# Patient Record
Sex: MALE | Race: WHITE
[De-identification: names, ages, dates, MRNs, and addresses within clinical notes are randomized per-mention and may not be internally consistent; named-entity substitution may affect disease eponyms.]

---

## 2018-07-07 ENCOUNTER — HOSPITAL ENCOUNTER (EMERGENCY)
Dept: HOSPITAL 25 - UCCORT | Age: 63
Discharge: HOME | End: 2018-07-07
Payer: COMMERCIAL

## 2018-07-07 VITALS — DIASTOLIC BLOOD PRESSURE: 59 MMHG | SYSTOLIC BLOOD PRESSURE: 117 MMHG

## 2018-07-07 DIAGNOSIS — J32.9: Primary | ICD-10-CM

## 2018-07-07 DIAGNOSIS — Z87.891: ICD-10-CM

## 2018-07-07 PROCEDURE — 99212 OFFICE O/P EST SF 10 MIN: CPT

## 2018-07-07 PROCEDURE — G0463 HOSPITAL OUTPT CLINIC VISIT: HCPCS

## 2018-07-07 NOTE — UC
Throat Pain/Nasal [place] HPI





- HPI Summary


HPI Summary: 





scratchy throat that has progressed to sinus pain and pressure, fullness in the 

ears and productive cough, slightly elevated TEmp this am, did take tylenol





- History of Current Complaint


Stated Complaint: COUGH,TRINO,RN,PEEWEE


Time Seen by Provider: 07/07/18 12:34


Hx Obtained From: Patient


Onset/Duration: Sudden Onset, Lasting Days


Severity: Moderate


Cough: Productive


Associated Signs & Symptoms: Positive: Dysphagia, Wheezing, Sinus Discomfort, 

Nasal Discharge, Fever





- Allergies/Home Medications


Allergies/Adverse Reactions: 


 Allergies











Allergy/AdvReac Type Severity Reaction Status Date / Time


 


No Known Allergies Allergy   Verified 08/11/15 15:53














PMH/Surg Hx/FS Hx/Imm Hx


Previously Healthy: Yes





- Surgical History


Surgical History: Yes


Surgery Procedure, Year, and Place: inguinal hernia 12 years ago





- Family History


Known Family History: Positive: Cardiac Disease





- Social History


Alcohol Use: Occasionally


Substance Use Type: None


Smoking Status (MU): Former Smoker





Review of Systems


Constitutional: Negative


Skin: Negative


Eyes: Negative


ENT: Sore Throat, Ear Ache, Nasal Discharge, Sinus Congestion, Sinus Pain/

Tenderness


Respiratory: Cough


Cardiovascular: Negative


Gastrointestinal: Negative


Genitourinary: Negative


Motor: Negative


Neurovascular: Negative


Musculoskeletal: Negative


Neurological: Headache


Psychological: Negative


Is Patient Immunocompromised?: No


All Other Systems Reviewed And Are Negative: Yes





Physical Exam


Triage Information Reviewed: Yes


Appearance: Well-Nourished, Ill-Appearing, Pain Distress


Vital Signs Reviewed: Yes


Eye Exam: Normal


ENT: Positive: Pharyngeal erythema, Nasal congestion, Nasal drainage, TM bulging

, Sinus tenderness


Dental Exam: Normal


Neck exam: Normal


Respiratory Exam: Normal


Respiratory: Positive: Chest non-tender, Lungs clear, Normal breath sounds


Cardiovascular Exam: Normal


Cardiovascular: Positive: RRR, No Murmur, Pulses Normal


Abdominal Exam: Normal


Abdomen Description: Positive: Nontender, No Organomegaly, Soft


Bowel Sounds: Positive: Present


Musculoskeletal Exam: Normal


Musculoskeletal: Positive: Strength Intact, ROM Intact, No Edema


Neurological Exam: Normal


Neurological: Positive: Alert, Muscle Tone Normal


Psychological Exam: Normal


Skin Exam: Normal





Throat Pain/Nasal Course/Dx





- Course


Course Of Treatment: hx obtained, exam performed ,meds reviewed, treated for 

sinusitis





- Differential Dx/Diagnosis


Differential Diagnosis/HQI/PQRI: Otitis Media, Pharyngitis, Sinusitis, URI


Provider Diagnoses: sinusitis





Discharge





- Sign-Out/Discharge


Documenting (check all that apply): Discharge/Admit/Transfer





- Discharge Plan


Condition: Stable


Disposition: HOME


Prescriptions: 


Azithromycin TAB* [Zithromax TAB (Z-MARKIE) 250 mg #6 tabs] 2 tab PO .TODAY, THEN 

1 DAILY #1 markie


Referrals: 


Mg Irizarry MD [Primary Care Provider] - 


Additional Instructions: 


1. take the medication as prescribed. 


2. INcrease fluid intake and get plenty of rest


3. FOllow up if not improving after 10 days.





- Billing Disposition and Condition


Condition: STABLE


Disposition: Home